# Patient Record
Sex: MALE | Race: ASIAN | URBAN - METROPOLITAN AREA
[De-identification: names, ages, dates, MRNs, and addresses within clinical notes are randomized per-mention and may not be internally consistent; named-entity substitution may affect disease eponyms.]

---

## 2019-09-16 ENCOUNTER — OFFICE VISIT (OUTPATIENT)
Dept: OTHER | Facility: OUTPATIENT CENTER | Age: 34
End: 2019-09-16
Payer: COMMERCIAL

## 2019-09-16 DIAGNOSIS — F43.23 ADJUSTMENT DISORDER WITH MIXED ANXIETY AND DEPRESSED MOOD: Primary | ICD-10-CM

## 2019-10-04 NOTE — PROGRESS NOTES
[facility]            [address] [place] [address] [address]  [place] [address]                                                                                Phone: [phone]                                                                                  Fax: [phone]                                                                    [email or web address]    Diagnostic Assessment Interview        Date of Service: 9/16/19  Client Name: Tamera Reddy  YOB: 1985  33 year old  MRN:  [id number]  Gender/Gender Identity: Male/Man  Treating Provider: Pina Cintron  Program:   REST  Type of Session: Assessment  Present in Session: Patient  Number of Minutes:  55     Updated Presenting Problem and Goals:  Has resolved problems related to previous history of rapid ejaculation.  Feeling more confident.   Now dealing with feelings of loss related to upsetting situations in dating relationships.  Feeling down and discouraged.  Confused.  Rather upsetting events have caused adjustment issues.    Updated Mental Health History:   Has been feeling pretty good and these distressing events have caused new stress in his life.  History of rapid ejaculation but no other mental health issues.    Updated Substance Use:   No evidence of abuse of any alcohol or drugs.    Updated Medical History:   In good health. Takes allegra for allergies.  Had a vasectomy in the past due to an enlarged vein.  Everything is fine now.    Mostly engages in safer sex.  Discussed importance of protection.    Updated Family History:   Client reported he grew up in [place].  Client described his current relationships with family of origin as excellent.  He talks to parents almost daily.  They still does not know that he is alejandre as he knows that they will be upset.  But plans to tell them when he is in a serious relationship.  He does not want to disturb them now.     Psychiatric history in family: none  Substance abuse in  "family:  none     Client reported a history of no committed relationships or marriages.   He did live with a partner for 4-5 months  In [place].  He was never that sexually attracted to him.  He dated another man for about 9 months - but it was never serious.  He has had a number of random \"hook ups.\"  Has never founds someone that he is in love with.  Has had some relationships that have lasted a short time and others have ended these relationships.  This has made him very discouraged about finding a relationship.  Dating has been very stressful and distressing given very upsetting experiences.  Client is currently single.  Client reported having no children.   Client reported that he has not been involved with the legal system.   Client's highest education level was graduate school.   Client did not identify any learning problems.   There are no ethnic, cultural or Christian factors that may be relevant for therapy.   Client identified his preferred language to be English.       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    Updated Occupational History:  He is looking to start a fellowship in [place].  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  Feels hopeful about his occupational history.    Updated Legal Issues:  None.    ________________________________________________________________  CONCLUSIONS    Updated Strengths and Liabilities:     He is a very bright young man who is psychologically sophisticated.  While coming from [place], he is quite westernized and adapting well to this environment.  He has a close relationship with his family and a good network of friends.  He is open to therapy and has already done a number of helpful things.       Updated Symptoms:  Dysphoric, distressed, frustrated.    See updated PHQ-9, CORONA-7, CAGE, and Safety Screening    Updated Mental Status:    "   Appearance:  Distressed  Behavior/relationship to examiner/demeanor:  Cooperative, Engaged and Pleasant  Orientation: Oriented to person, place, time  Speech Rate:  Normal  Speech Spontaneity:  Normal  Mood:  dysphoric, distraught and grieving  Affect:  Appropriate/mood-congruent and Dysphoric  Thought Process (Associations):  Logical, Linear and Goal directed  Thought Content:  no evidence of suicidal or homicidal ideation  Abnormal Perception:  None  Attention/Concentration:  Normal  Language:  Intact  Insight:  Good  Judgment:  Good        Interactive Complexity:  N/A    Updated DSM-5 Diagnoses:  309.28  Adjustment Disorder with mixed emotional experiences.    Conclusions/Recommendations/Initial Treatment Goals:   The client is a 33 year old [place] person assigned male at birth who identifies as a man. Based on the client's current report of symptoms, client continues to meet criteria for adjustment disorder. The client's mental health concerns continue to affect client's ability to function interpersonally and have been causing clinically significant distress. The client reports no drug/alcohol abuse.    Client denies safety concerns. Based on the client's reported symptoms and impact on functioning, the plan for the patient is:  1. Continue supportive individual therapy  2. Continue to assess the impact of client's family and relational patterns on their current well-being.        Pina Cintron, PhD, LP

## 2019-10-07 PROBLEM — F43.23 ADJUSTMENT DISORDER WITH MIXED ANXIETY AND DEPRESSED MOOD: Status: ACTIVE | Noted: 2019-10-07

## 2019-10-07 NOTE — ADDENDUM NOTE
Addended by: OTTONIEL GOEL on: 10/7/2019 05:31 PM     Modules accepted: Orders, Level of Service

## 2020-03-10 ENCOUNTER — HEALTH MAINTENANCE LETTER (OUTPATIENT)
Age: 35
End: 2020-03-10

## 2020-04-14 DIAGNOSIS — J02.0 STREPTOCOCCAL SORE THROAT: Primary | ICD-10-CM

## 2020-04-14 RX ORDER — AZITHROMYCIN 500 MG/1
1000 TABLET, FILM COATED ORAL ONCE
Qty: 2 TABLET | Refills: 0 | Status: SHIPPED | OUTPATIENT
Start: 2020-04-15 | End: 2020-04-15

## 2020-12-20 ENCOUNTER — HEALTH MAINTENANCE LETTER (OUTPATIENT)
Age: 35
End: 2020-12-20

## 2021-04-24 ENCOUNTER — HEALTH MAINTENANCE LETTER (OUTPATIENT)
Age: 36
End: 2021-04-24

## 2021-10-03 ENCOUNTER — HEALTH MAINTENANCE LETTER (OUTPATIENT)
Age: 36
End: 2021-10-03

## 2022-05-15 ENCOUNTER — HEALTH MAINTENANCE LETTER (OUTPATIENT)
Age: 37
End: 2022-05-15

## 2022-09-11 ENCOUNTER — HEALTH MAINTENANCE LETTER (OUTPATIENT)
Age: 37
End: 2022-09-11

## 2023-06-03 ENCOUNTER — HEALTH MAINTENANCE LETTER (OUTPATIENT)
Age: 38
End: 2023-06-03